# Patient Record
Sex: MALE | Race: WHITE | NOT HISPANIC OR LATINO | Employment: FULL TIME | URBAN - METROPOLITAN AREA
[De-identification: names, ages, dates, MRNs, and addresses within clinical notes are randomized per-mention and may not be internally consistent; named-entity substitution may affect disease eponyms.]

---

## 2019-01-20 ENCOUNTER — HOSPITAL ENCOUNTER (EMERGENCY)
Facility: MEDICAL CENTER | Age: 59
End: 2019-01-20
Attending: EMERGENCY MEDICINE
Payer: COMMERCIAL

## 2019-01-20 ENCOUNTER — APPOINTMENT (OUTPATIENT)
Dept: RADIOLOGY | Facility: MEDICAL CENTER | Age: 59
End: 2019-01-20
Attending: EMERGENCY MEDICINE
Payer: COMMERCIAL

## 2019-01-20 VITALS
HEIGHT: 70 IN | WEIGHT: 227.07 LBS | BODY MASS INDEX: 32.51 KG/M2 | SYSTOLIC BLOOD PRESSURE: 145 MMHG | OXYGEN SATURATION: 95 % | HEART RATE: 60 BPM | DIASTOLIC BLOOD PRESSURE: 87 MMHG | RESPIRATION RATE: 18 BRPM | TEMPERATURE: 96.8 F

## 2019-01-20 DIAGNOSIS — K42.9 UMBILICAL HERNIA WITHOUT OBSTRUCTION AND WITHOUT GANGRENE: ICD-10-CM

## 2019-01-20 LAB
ALBUMIN SERPL BCP-MCNC: 4.5 G/DL (ref 3.2–4.9)
ALBUMIN/GLOB SERPL: 1.5 G/DL
ALP SERPL-CCNC: 75 U/L (ref 30–99)
ALT SERPL-CCNC: 23 U/L (ref 2–50)
ANION GAP SERPL CALC-SCNC: 8 MMOL/L (ref 0–11.9)
AST SERPL-CCNC: 20 U/L (ref 12–45)
BASOPHILS # BLD AUTO: 1 % (ref 0–1.8)
BASOPHILS # BLD: 0.06 K/UL (ref 0–0.12)
BILIRUB SERPL-MCNC: 0.4 MG/DL (ref 0.1–1.5)
BUN SERPL-MCNC: 18 MG/DL (ref 8–22)
CALCIUM SERPL-MCNC: 9.7 MG/DL (ref 8.5–10.5)
CHLORIDE SERPL-SCNC: 109 MMOL/L (ref 96–112)
CO2 SERPL-SCNC: 25 MMOL/L (ref 20–33)
CREAT SERPL-MCNC: 1.16 MG/DL (ref 0.5–1.4)
EOSINOPHIL # BLD AUTO: 0.42 K/UL (ref 0–0.51)
EOSINOPHIL NFR BLD: 7.2 % (ref 0–6.9)
ERYTHROCYTE [DISTWIDTH] IN BLOOD BY AUTOMATED COUNT: 43.4 FL (ref 35.9–50)
GLOBULIN SER CALC-MCNC: 3 G/DL (ref 1.9–3.5)
GLUCOSE SERPL-MCNC: 89 MG/DL (ref 65–99)
HCT VFR BLD AUTO: 44 % (ref 42–52)
HGB BLD-MCNC: 15 G/DL (ref 14–18)
IMM GRANULOCYTES # BLD AUTO: 0.02 K/UL (ref 0–0.11)
IMM GRANULOCYTES NFR BLD AUTO: 0.3 % (ref 0–0.9)
LIPASE SERPL-CCNC: 7 U/L (ref 11–82)
LYMPHOCYTES # BLD AUTO: 2.05 K/UL (ref 1–4.8)
LYMPHOCYTES NFR BLD: 35.2 % (ref 22–41)
MCH RBC QN AUTO: 32.4 PG (ref 27–33)
MCHC RBC AUTO-ENTMCNC: 34.1 G/DL (ref 33.7–35.3)
MCV RBC AUTO: 95 FL (ref 81.4–97.8)
MONOCYTES # BLD AUTO: 0.58 K/UL (ref 0–0.85)
MONOCYTES NFR BLD AUTO: 9.9 % (ref 0–13.4)
NEUTROPHILS # BLD AUTO: 2.7 K/UL (ref 1.82–7.42)
NEUTROPHILS NFR BLD: 46.4 % (ref 44–72)
NRBC # BLD AUTO: 0 K/UL
NRBC BLD-RTO: 0 /100 WBC
PLATELET # BLD AUTO: 215 K/UL (ref 164–446)
PMV BLD AUTO: 9.2 FL (ref 9–12.9)
POTASSIUM SERPL-SCNC: 4.2 MMOL/L (ref 3.6–5.5)
PROT SERPL-MCNC: 7.5 G/DL (ref 6–8.2)
RBC # BLD AUTO: 4.63 M/UL (ref 4.7–6.1)
SODIUM SERPL-SCNC: 142 MMOL/L (ref 135–145)
WBC # BLD AUTO: 5.8 K/UL (ref 4.8–10.8)

## 2019-01-20 PROCEDURE — 83690 ASSAY OF LIPASE: CPT

## 2019-01-20 PROCEDURE — 80053 COMPREHEN METABOLIC PANEL: CPT

## 2019-01-20 PROCEDURE — 74177 CT ABD & PELVIS W/CONTRAST: CPT

## 2019-01-20 PROCEDURE — 99284 EMERGENCY DEPT VISIT MOD MDM: CPT

## 2019-01-20 PROCEDURE — 85025 COMPLETE CBC W/AUTO DIFF WBC: CPT

## 2019-01-20 PROCEDURE — 700117 HCHG RX CONTRAST REV CODE 255: Performed by: EMERGENCY MEDICINE

## 2019-01-20 PROCEDURE — 36415 COLL VENOUS BLD VENIPUNCTURE: CPT

## 2019-01-20 RX ADMIN — IOHEXOL 100 ML: 350 INJECTION, SOLUTION INTRAVENOUS at 06:40

## 2019-01-20 NOTE — ED PROVIDER NOTES
"ED Provider Note    CHIEF COMPLAINT  Chief Complaint   Patient presents with   • Abdominal Pain     Pt reports pain at the umbilical site while moving heaving objects.         HPI  Lg Aldana is a 58 y.o. male who presents with abdominal pain.  Localized at the umbilicus.  Started while moving a heavy object.  Has pain if he pushes over his umbilicus.  This can be quite severe if he sits still and does not move he does not have pain.  The pain is also worsened with sitting up or flexing his abdominal muscles.  Sharp nonradiating started this morning.  No fever.  No vomiting.  Normal bowel movement.    REVIEW OF SYSTEMS  As above  All other systems are negative.     PAST MEDICAL HISTORY  History reviewed. No pertinent past medical history.    FAMILY HISTORY  History reviewed. No pertinent family history.    SOCIAL HISTORY  Social History   Substance Use Topics   • Smoking status: Never Smoker   • Smokeless tobacco: Current User     Types: Chew   • Alcohol use No       SURGICAL HISTORY  History reviewed. No pertinent surgical history.    CURRENT MEDICATIONS  Home Medications     Reviewed by Libertad Schmidt R.N. (Registered Nurse) on 01/20/19 at 0584  Med List Status: Unable to Obtain   Medication Last Dose Status        Patient Johnathan Taking any Medications                       ALLERGIES  Allergies   Allergen Reactions   • Morphine Shortness of Breath       PHYSICAL EXAM  VITAL SIGNS: /93   Pulse 84   Temp 36.2 °C (97.2 °F) (Temporal)   Resp 18   Ht 1.778 m (5' 10\")   Wt 103 kg (227 lb 1.2 oz)   SpO2 98%   BMI 32.58 kg/m²   Constitutional: Awake and alert  HENT: Moist mucous membranes  Eyes: Sclera white  Neck: Normal range of motion  Cardiovascular: Normal heart rate, Normal rhythm  Thorax & Lungs: Normal breath sounds, No respiratory distress, No wheezing, No chest tenderness.   Abdomen: Patient has tenderness in the umbilicus.  Questionable palpable mass.  No tenderness throughout the abdomen " otherwise.  Skin: No rash.   Back: No tenderness, No CVA tenderness.   Extremities: Intact, symmetric distal pulses, no edema.  Neurologic: Grossly normal    RADIOLOGY/PROCEDURES  CT-ABDOMEN-PELVIS WITH   Final Result      Moderate colonic diverticulosis without evidence of diverticulitis      Hepatic and renal cysts      Left hemidiaphragm elevation      L5 pars interarticularis defects         Imaging is interpreted by radiologist    Labs:   Results for orders placed or performed during the hospital encounter of 01/20/19   CBC WITH DIFFERENTIAL   Result Value Ref Range    WBC 5.8 4.8 - 10.8 K/uL    RBC 4.63 (L) 4.70 - 6.10 M/uL    Hemoglobin 15.0 14.0 - 18.0 g/dL    Hematocrit 44.0 42.0 - 52.0 %    MCV 95.0 81.4 - 97.8 fL    MCH 32.4 27.0 - 33.0 pg    MCHC 34.1 33.7 - 35.3 g/dL    RDW 43.4 35.9 - 50.0 fL    Platelet Count 215 164 - 446 K/uL    MPV 9.2 9.0 - 12.9 fL    Neutrophils-Polys 46.40 44.00 - 72.00 %    Lymphocytes 35.20 22.00 - 41.00 %    Monocytes 9.90 0.00 - 13.40 %    Eosinophils 7.20 (H) 0.00 - 6.90 %    Basophils 1.00 0.00 - 1.80 %    Immature Granulocytes 0.30 0.00 - 0.90 %    Nucleated RBC 0.00 /100 WBC    Neutrophils (Absolute) 2.70 1.82 - 7.42 K/uL    Lymphs (Absolute) 2.05 1.00 - 4.80 K/uL    Monos (Absolute) 0.58 0.00 - 0.85 K/uL    Eos (Absolute) 0.42 0.00 - 0.51 K/uL    Baso (Absolute) 0.06 0.00 - 0.12 K/uL    Immature Granulocytes (abs) 0.02 0.00 - 0.11 K/uL    NRBC (Absolute) 0.00 K/uL   COMP METABOLIC PANEL   Result Value Ref Range    Sodium 142 135 - 145 mmol/L    Potassium 4.2 3.6 - 5.5 mmol/L    Chloride 109 96 - 112 mmol/L    Co2 25 20 - 33 mmol/L    Anion Gap 8.0 0.0 - 11.9    Glucose 89 65 - 99 mg/dL    Bun 18 8 - 22 mg/dL    Creatinine 1.16 0.50 - 1.40 mg/dL    Calcium 9.7 8.5 - 10.5 mg/dL    AST(SGOT) 20 12 - 45 U/L    ALT(SGPT) 23 2 - 50 U/L    Alkaline Phosphatase 75 30 - 99 U/L    Total Bilirubin 0.4 0.1 - 1.5 mg/dL    Albumin 4.5 3.2 - 4.9 g/dL    Total Protein 7.5 6.0 - 8.2  g/dL    Globulin 3.0 1.9 - 3.5 g/dL    A-G Ratio 1.5 g/dL   LIPASE   Result Value Ref Range    Lipase 7 (L) 11 - 82 U/L   ESTIMATED GFR   Result Value Ref Range    GFR If African American >60 >60 mL/min/1.73 m 2    GFR If Non African American >60 >60 mL/min/1.73 m 2       Medications   iohexol (OMNIPAQUE) 350 mg/mL (100 mL Intravenous Given 1/20/19 0640)       COURSE & MEDICAL DECISION MAKING  Patient presents with umbilical pain.  He has a palpable mass wound which is difficult to differentiate between hernia versus other subcutaneous abnormality.  This area was tender.  I obtained a CT scan laboratory data.  Laboratory data was reassuring.  CT scan did demonstrate small umbilical hernia with incarcerated fat.    I consulted Dr Ng.  Discussed the case.  He advised discharge with follow-up in his office this week.  This seems reasonable.  I precautioned the patient to return to the ER for fever, redness of the skin on the abdomen, vomiting, uncontrolled pain or concern.    Given that this appears to be an on-the-job injury/event I have referred him to occupational health and advised that he follow-up as soon as possible.  I have advised no lifting until cleared.    Patient referred to primary provider for blood pressure management    FINAL IMPRESSION  1.  Umbilical hernia with fat incarceration        This dictation was created using voice recognition software. The accuracy of the dictation is limited to the abilities of the software.  The nursing notes were reviewed and certain aspects of this information were incorporated into this note.    Electronically signed by: Abrahan Malin, 1/20/2019 6:59 AM

## 2019-01-20 NOTE — LETTER
"  FORM C-4:  EMPLOYEE’S CLAIM FOR COMPENSATION/ REPORT OF INITIAL TREATMENT  EMPLOYEE’S CLAIM - PROVIDE ALL INFORMATION REQUESTED   First Name  Lg Last Name  Jovan Birthdate             Age  1960 58 y.o. Sex  male Claim Number   Home Employee Address  [address]  [facility]                                     Zip  [address] Height  1.778 m (5' 10\") Weight  103 kg (227 lb 1.2 oz) [facility]     Mailing Employee Address                           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   Employer's Name  [employer] Telephone  [phone]    Employer Address  [address] [facility] [address]] Zip  [address]   Date of Injury  1/20/2019       Hour of Injury  3:00 AM Date Employer Notified  1/20/2019 Last Day of Work after Injury or Occupational Disease  1/20/2019 Supervisor to Whom Injury Reported  [employer] [employer]    Address or Location of Accident (if applicable)  [[facility] 3rd floor [address]]   What were you doing at the time of accident? (if applicable)  IC ingressond Picture frame Egress    How did this injury or occupational disease occur? Be specific and answer in detail. Use additional sheet if necessary)  Pulling Picture frAmes from [employer]   If you believe that you have an occupational disease, when did you first have knowledge of the disability and it relationship to your employment?  NA Witnesses to the Accident  NA     Nature of Injury or Occupational Disease  Workers' Compensation  Part(s) of Body Injured or Affected  Internal Organs, N/A, N/A    I certify that the above is true and correct to the best of my knowledge and that I have provided this information in order to obtain the benefits of [place] " [place] or [place]).  I hereby authorize any physician, chiropractor, surgeon, practitioner, or other person, any hospital, including [facility] hospital, any medical service organization, any insurance company, or other institution or organization to release to each other, any medical or other information, including benefits paid or payable, pertinent to this injury or disease, except information relative to diagnosis, treatment and/or counseling for AIDS, psychological conditions, alcohol or controlled substances, for which I must give specific authorization.  A Photostat of this authorization shall be as valid as the original.   Date  01/20/2019 Place  [facility] Employee’s Signature   THIS REPORT MUST BE COMPLETED AND MAILED WITHIN 3 WORKING DAYS OF TREATMENT   Place  [facility], EMERGENCY DEPT  Name of Facility   [facility]   Date  1/20/2019 Diagnosis  (K42.9) Umbilical hernia without obstruction and without gangrene Is there evidence the injured employee was under the influence of alcohol and/or another controlled substance at the time of accident?   Hour  7:34 AM Description of Injury or Disease  Umbilical hernia without obstruction and without gangrene No   Treatment  Referral for surgical repair of hernia  Have you advised the patient to remain off work five days or more?         No   X-Ray Findings  Positive  Comments:Umbilical hernia with fat incarceration   If Yes   From Date    To Date      From information given by the employee, together with medical evidence, can you directly connect this injury or occupational disease as job incurred?  Yes If No, is the employee capable of: Full Duty  No Modified Duty  Yes   Is additional medical care by a physician indicated?  Yes If Modified Duty, Specify any Limitations / Restrictions  No lifting more than 5  "pounds until cleared by occupational health     Do you know of any previous injury or disease contributing to this condition or occupational disease?  No   Date  1/20/2019 Print Doctor’s Name  Jericho Malin certify the employer’s copy of this form was mailed on:   Address  [address] [facility] [address] [phone] Insurer’s Use Only   [facility]  [address]    Provider’s Tax ID Number  [id number] Telephone  Dept: [phone]    Doctor’s Signature  e-JERICHO Chicas M.D. Degree   M.D.    Original - TREATING PHYSICIAN OR CHIROPRACTOR   Pg 2-Insurer/TPA   Pg 3-Employer   Pg 4-Employee                                                                                                  Form C-4 (rev01/03)   BRIEF DESCRIPTION OF RIGHTS AND BENEFITS  (Pursuant to [place]RS 616C.050)  Notice of Injury or Occupational Disease (Incident Report Form C-1): If an injury or occupational disease (OD) arises out of and in the course of employment, you must provide written notice to your employer as soon as practicable, but no later than 7 days after the accident or OD. Your employer shall maintain a sufficient supply of the required forms.  Claim for Compensation (Form C-4): If medical treatment is sought, the form C-4 is available at the place of initial treatment. A completed \"Claim for Compensation\" (Form C-4) must be filed within 90 days after an accident or OD. The treating physician or chiropractor must, within 3 working days after treatment, complete and mail to the employer, the employer's insurer and third-party , the Claim for Compensation.  Medical Treatment: If you require medical treatment for your on-the-job injury or OD, you may be required to select a physician or chiropractor from a list provided by your workers’ compensation insurer, if it has contracted with an Organization for Managed Care (MCO) or Preferred Provider Organization (PPO) or providers of health care. If your " employer has not entered into a contract with an MCO or PPO, you may select a physician or chiropractor from the Panel of Physicians and Chiropractors. Any medical costs related to your industrial injury or OD will be paid by your insurer.  Temporary Total Disability (TTD): If your doctor has certified that you are unable to work for a period of at least 5 consecutive days, or 5 cumulative days in a 20-day period, or places restrictions on you that your employer does not accommodate, you may be entitled to TTD compensation.  Temporary Partial Disability (TPD): If the wage you receive upon reemployment is less than the compensation for TTD to which you are entitled, the insurer may be required to pay you TPD compensation to make up the difference. TPD can only be paid for a maximum of 24 months.  Permanent Partial Disability (PPD): When your medical condition is stable and there is an indication of a PPD as a result of your injury or OD, within 30 days, your insurer must arrange for an evaluation by a rating physician or chiropractor to determine the degree of your PPD. The amount of your PPD award depends on the date of injury, the results of the PPD evaluation and your age and wage.  Permanent Total Disability (PTD): If you are medically certified by a treating physician or chiropractor as permanently and totally disabled and have been granted a PTD status by your insurer, you are entitled to receive monthly benefits not to exceed 66 2/3% of your average monthly wage. The amount of your PTD payments is subject to reduction if you previously received a PPD award.  Vocational Rehabilitation Services: You may be eligible for vocational rehabilitation services if you are unable to return to the job due to a permanent physical impairment or permanent restrictions as a result of your injury or occupational disease.  Transportation and Per [place] Reimbursement: You may be eligible for travel expenses and per colby associated  with medical treatment.  Reopening: You may be able to reopen your claim if your condition worsens after claim closure.  Appeal Process: If you disagree with a written determination issued by the insurer or the insurer does not respond to your request, you may appeal to the Department of Administration, , by following the instructions contained in your determination letter. You must appeal the determination within 70 days from the date of the determination letter at [address], [address], [place], [place] [address], or [address] [place], [place], [place] [address]. If you disagree with the  decision, you may appeal to the Department of Administration, . You must file your appeal within 30 days from the date of the  decision letter at [address], [address], [place], [place] [address], or [address], [place], [place] [address]. If you disagree with a decision of an , you may file a petition for judicial review with the District Court. You must do so within 30 days of the Appeal Officer’s decision. You may be represented by an  at your own expense or you may contact the [facility] for possible representation.  [place]  for Injured Workers (NAIW): If you disagree with a  decision, you may request that NAIW represent you without charge at an  Hearing. For information regarding denial of benefits, you may contact the [facility] at: [address], [address], [place] [address], [phone], or [address] [place], [place] [address], [phone]  To File a Complaint with the Division: If you wish to file a complaint with the  of the Division of Industrial Relations (DIR), please contact the Workers’ Compensation Section, [address], [address], [place], [place] [address], telephone (214) [phone], or [address], [address], [place], [place] [address], telephone [phone]) [phone].  For assistance with Workers’ Compensation Issues: you may contact the Office of the Governor Consumer Health Assistance, [address] [place] [address], [address], [place], [place] [address], Toll Free 1-533.313.2490, Web site: http://[employer].[facility].nv., E-mail [email or web address].                                                                                                                                                                   __________________________________________________________________               01/20/2019            Employee Name / Signature                                                                                                                            Date                                       D-2 (rev. 10/07)

## 2019-01-20 NOTE — ED TRIAGE NOTES
"Chief Complaint   Patient presents with   • Abdominal Pain     Pt reports pain at the umbilical site while moving heaving objects.       /93   Pulse 84   Temp 36.2 °C (97.2 °F) (Temporal)   Resp 18   Ht 1.778 m (5' 10\")   Wt 103 kg (227 lb 1.2 oz)   SpO2 98%   BMI 32.58 kg/m²     Pt comes to ER with new onset abdominal pain. Pain is described as dull unless the umbilical region is palpated. Pt also reports pain with laughter and contraction of abdominal muscles. Pt reports that he was moving heavy objects at work while this happened. Denies N/V/D.    VSS on RA, A&Ox4.     Pt encouraged to report any changes in condition to staff, back to ED lobby with steady gait.  "

## 2019-01-20 NOTE — ED NOTES
This RN assumed report from previous RN. Pt care taken over by this RN at this time.     Pt assessed by this RN. This RN introduced self to patient and updated on POC. Pt verbalized understanding. Pt resting with no distress. VSS at this time. Call bell in reach. Continue to monitor.

## 2019-01-20 NOTE — ED NOTES
Pt discharge ordered by provider. Pt discharge instructions reviewed with patient by this RN. Pt verbalized understanding of discharge information. Pt discharged alert, oriented, and ambulatory by this RN. Pt has concerns regarding occupational followup. With Dr. Malin's permission this RN printed copy of patients CT scan report to bring with him to f/u appt. Pt educated that Dr. Malin has connected him with Surgeon Dr. Ng who has availability to see him this week if his occupational health provider approves. Pt verbalized understanding of dc instructions.

## 2019-02-07 ENCOUNTER — OCCUPATIONAL MEDICINE (OUTPATIENT)
Dept: OCCUPATIONAL MEDICINE | Facility: CLINIC | Age: 59
End: 2019-02-07
Payer: COMMERCIAL

## 2019-02-07 VITALS
SYSTOLIC BLOOD PRESSURE: 144 MMHG | BODY MASS INDEX: 32.29 KG/M2 | WEIGHT: 218 LBS | DIASTOLIC BLOOD PRESSURE: 86 MMHG | HEART RATE: 105 BPM | OXYGEN SATURATION: 96 % | HEIGHT: 69 IN | TEMPERATURE: 98.4 F

## 2019-02-07 DIAGNOSIS — K42.9 UMBILICAL HERNIA WITHOUT OBSTRUCTION AND WITHOUT GANGRENE: ICD-10-CM

## 2019-02-07 PROCEDURE — 99203 OFFICE O/P NEW LOW 30 MIN: CPT | Performed by: PREVENTIVE MEDICINE

## 2019-02-07 NOTE — LETTER
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  Diagnosis: The encounter diagnosis was Umbilical hernia without obstruction and without gangrene.    Medical Information   Related to Industrial Injury?   Comments:Indeterminate    Subjective Complaints:  DOI 1/20/2019: 57 yo male presents with umbilical hernia.  He was moving a heavy object when he noted sudden pain in his abdomen and protrusion of umbilicus.  He was seen in the ER, CT scan confirmed umbilical hernia.  Patient states overall symptoms about the same.  The hernia still somewhat tender.  Denies nausea or vomiting.  Patient states that he works down here 2 weeks and then returns home to [place] for 2 weeks and would prefer to have surgery done there in [place].   Objective Findings: Abdomen: Noted umbilical hernia, mildly tender, no other areas of tenderness.  Bowel sounds x4.   Pre-Existing Condition(s):     Assessment:   Condition Same    Status: Additional Care Required  Permanent Disability:No    Plan:      Diagnostics:      Comments:  Referral to general surgery  Restricted duty  OTC NSAIDs as needed  Follow up 4 weeks if not seen by General surgery.    Disability Information   Status: Released to Restricted Duty    From:  2/7/2019  Through: 3/7/2019 Restrictions are:     Physical Restrictions   Sitting:    Standing:    Stooping:    Bending:      Squatting:    Walking:    Climbing:    Pushing:      Pulling:    Other:    Reaching Above Shoulder (L):   Reaching Above Shoulder (R):       Reaching Below Shoulder  (L):    Reaching Below Shoulder (R):      Not to exceed Weight Limits   Carrying(hrs):   Weight Limit(lb):   Lifting(hrs):   Weight  Limit(lb):     Comments: Less than 5 lbs lift/push/pull    Repetitive Actions   Hands: i.e. Fine Manipulations from Grasping:     Feet: i.e. Operating Foot Controls:     Driving / Operate Machinery:     Physician Name: Ethan Knapp D.O. Physician Signature: danielSignTAETHAN HERNANDEZ D.O. e-Signature: Dr. Clarke Cortés, Medical Director   Clinic Name / Location: [address],   [address]  [place] [address] Clinic Phone Number: Dept: [phone]   Appointment Time: 2:15 Pm Visit Start Time: 1:52 PM   Check-In Time:  1:52 Pm Visit Discharge Time:  2:27 PM   Original-Treating Physician or Chiropractor    Page 2-Insurer/TPA    Page 3-Employer    Page 4-Employee

## 2019-02-07 NOTE — PROGRESS NOTES
"Subjective:      Lg Aldana is a 58 y.o. male who presents with Other (Doi 01/20/19- hernia- same )      DOI 1/20/2019: 57 yo male presents with umbilical hernia.  He was moving a heavy object when he noted sudden pain in his abdomen and protrusion of umbilicus.  He was seen in the ER, CT scan confirmed umbilical hernia.     HPI    ROS  ROS: All systems were reviewed on intake form, form was reviewed and signed. See scanned documents in media. Pertinent positives and negatives included in HPI.    PMH: No pertinent past medical history to this problem  MEDS: Medications were reviewed in Epic  ALLERGIES:   Allergies   Allergen Reactions   • Morphine Shortness of Breath     SOCHX: Works as  at [email or web address]  FH: No pertinent family history to this problem     Objective:     /86   Pulse (!) 105   Temp 36.9 °C (98.4 °F)   Ht 1.753 m (5' 9\")   Wt 98.9 kg (218 lb)   SpO2 96%   BMI 32.19 kg/m²      Physical Exam   Constitutional: He is oriented to person, place, and time. He appears well-developed and well-nourished.   HENT:   Right Ear: External ear normal.   Left Ear: External ear normal.   Eyes: Conjunctivae are normal.   Cardiovascular: Normal rate.    Pulmonary/Chest: Effort normal.   Neurological: He is alert and oriented to person, place, and time.   Skin: Skin is warm and dry.   Psychiatric: He has a normal mood and affect. Judgment normal.       Abdomen: Noted umbilical hernia, mildly tender, no other areas of tenderness.  Bowel sounds x4.       Assessment/Plan:     1. Umbilical hernia without obstruction and without gangrene  - REFERRAL TO GENERAL SURGERY    Referral to general surgery  Restricted duty  OTC NSAIDs as needed  Follow up 4 weeks if not seen by General surgery.  "

## 2019-03-07 ENCOUNTER — TELEPHONE (OUTPATIENT)
Dept: OCCUPATIONAL MEDICINE | Facility: CLINIC | Age: 59
End: 2019-03-07

## 2019-03-07 ENCOUNTER — OCCUPATIONAL MEDICINE (OUTPATIENT)
Dept: OCCUPATIONAL MEDICINE | Facility: CLINIC | Age: 59
End: 2019-03-07
Payer: COMMERCIAL

## 2019-03-07 VITALS
TEMPERATURE: 97.8 F | DIASTOLIC BLOOD PRESSURE: 98 MMHG | WEIGHT: 218 LBS | BODY MASS INDEX: 32.29 KG/M2 | HEIGHT: 69 IN | OXYGEN SATURATION: 95 % | SYSTOLIC BLOOD PRESSURE: 140 MMHG | HEART RATE: 96 BPM

## 2019-03-07 DIAGNOSIS — K42.9 UMBILICAL HERNIA WITHOUT OBSTRUCTION AND WITHOUT GANGRENE: ICD-10-CM

## 2019-03-07 PROCEDURE — 99212 OFFICE O/P EST SF 10 MIN: CPT | Performed by: PREVENTIVE MEDICINE

## 2019-03-07 NOTE — LETTER
[address],   [address] [place] [address]  Phone:  [phone] - Fax:  [phone]   Occupational Health [facility] Progress Report and Disability Certification  Date of Service: 3/7/2019   No Show:  No  Date / Time of Next Visit:  [facility] Surgery   Claim Information   Patient Name: Lg Aldana  Claim Number:     Employer: [employer]  Date of Injury: 1/20/2019     Insurer / TPA: [employer]  ID / SSN:     Occupation: Production Asso.  Diagnosis: The encounter diagnosis was Umbilical hernia without obstruction and without gangrene.    Medical Information   Related to Industrial Injury? Yes    Subjective Complaints:  DOI 1/20/2019: 57 yo male presents with umbilical hernia.  He was moving a heavy object when he noted sudden pain in his abdomen and protrusion of umbilicus.  He was seen in the ER, CT scan confirmed umbilical hernia.  Claim approved.  Patient has appointment with general surgery next week.   Objective Findings: Abdomen: Noted umbilical hernia, mildly tender, no other areas of tenderness.  Bowel sounds x4.   Pre-Existing Condition(s):     Assessment:   Condition Same    Status: Discharged / Care Transfer  Permanent Disability:No    Plan:      Diagnostics:      Comments:  Transfer care to general surgery  Continue restricted duty  Follow-up as needed    Disability Information   Status: Released to Restricted Duty    From:  3/7/2019  Through:   Restrictions are: Temporary   Physical Restrictions   Sitting:    Standing:    Stooping:    Bending:      Squatting:    Walking:    Climbing:    Pushing:      Pulling:    Other:    Reaching Above Shoulder (L):   Reaching Above Shoulder (R):       Reaching Below Shoulder (L):    Reaching Below Shoulder (R):      Not to exceed Weight Limits   Carrying(hrs):   Weight Limit(lb):   Lifting(hrs):   Weight  Limit(lb):     Comments: Less than 5 lbs lift/push/pull. In place until cleared by General Surgery       Repetitive Actions   Hands: i.e. Fine Manipulations from Grasping:     Feet: i.e. Operating Foot Controls:     Driving / Operate Machinery:     Physician Name: Ethan Knapp D.O. Physician Signature: ETHAN Arriaga D.O. e-Signature: Dr. Clarke Cortés, Medical Director   Clinic Name / Location: [address],   [address]  [place] [address] Clinic Phone Number: Dept: [phone]   Appointment Time: 3:15 Pm Visit Start Time: 3:07 PM   Check-In Time:  3:05 Pm Visit Discharge Time:  3:25 PM   Original-Treating Physician or Chiropractor    Page 2-Insurer/TPA    Page 3-Employer    Page 4-Employee

## 2019-03-07 NOTE — PROGRESS NOTES
"Subjective:      Lg Aldana is a 58 y.o. male who presents with Follow-Up (01/20/19- hernia- same )      DOI 1/20/2019: 57 yo male presents with umbilical hernia.  He was moving a heavy object when he noted sudden pain in his abdomen and protrusion of umbilicus.  He was seen in the ER, CT scan confirmed umbilical hernia.  Claim approved.  Patient has appointment with general surgery next week.     HPI    ROS       Objective:     /98   Pulse 96   Temp 36.6 °C (97.8 °F)   Ht 1.753 m (5' 9\")   Wt 98.9 kg (218 lb)   SpO2 95%   BMI 32.19 kg/m²      Physical Exam    Abdomen: Noted umbilical hernia, mildly tender, no other areas of tenderness.  Bowel sounds x4.       Assessment/Plan:     1. Umbilical hernia without obstruction and without gangrene  Transfer care to general surgery   Continue restricted duty   Follow-up as needed      "

## 2019-04-11 ENCOUNTER — APPOINTMENT (OUTPATIENT)
Dept: ADMISSIONS | Facility: MEDICAL CENTER | Age: 59
End: 2019-04-11
Attending: SURGERY
Payer: COMMERCIAL

## 2019-04-11 RX ORDER — BUPROPION HYDROCHLORIDE 75 MG/1
75 TABLET ORAL 2 TIMES DAILY
COMMUNITY

## 2019-04-11 RX ORDER — MELOXICAM 15 MG/1
15 TABLET ORAL EVERY EVENING
COMMUNITY

## 2019-04-11 RX ORDER — TESTOSTERONE 40.5 MG/2.5G
40.5 GEL TOPICAL DAILY
COMMUNITY

## 2019-04-11 RX ORDER — RANITIDINE 150 MG/1
150 TABLET ORAL
COMMUNITY

## 2019-04-24 ENCOUNTER — ANESTHESIA EVENT (OUTPATIENT)
Dept: SURGERY | Facility: MEDICAL CENTER | Age: 59
End: 2019-04-24
Payer: COMMERCIAL

## 2019-04-24 ENCOUNTER — HOSPITAL ENCOUNTER (OUTPATIENT)
Facility: MEDICAL CENTER | Age: 59
End: 2019-04-24
Attending: SURGERY | Admitting: SURGERY
Payer: COMMERCIAL

## 2019-04-24 ENCOUNTER — ANESTHESIA (OUTPATIENT)
Dept: SURGERY | Facility: MEDICAL CENTER | Age: 59
End: 2019-04-24
Payer: COMMERCIAL

## 2019-04-24 VITALS
TEMPERATURE: 98.4 F | WEIGHT: 227.29 LBS | HEART RATE: 68 BPM | RESPIRATION RATE: 18 BRPM | SYSTOLIC BLOOD PRESSURE: 138 MMHG | HEIGHT: 70 IN | BODY MASS INDEX: 32.54 KG/M2 | OXYGEN SATURATION: 98 % | DIASTOLIC BLOOD PRESSURE: 92 MMHG

## 2019-04-24 DIAGNOSIS — G89.18 POST-OP PAIN: ICD-10-CM

## 2019-04-24 PROCEDURE — 700111 HCHG RX REV CODE 636 W/ 250 OVERRIDE (IP): Performed by: ANESTHESIOLOGY

## 2019-04-24 PROCEDURE — 700102 HCHG RX REV CODE 250 W/ 637 OVERRIDE(OP): Performed by: ANESTHESIOLOGY

## 2019-04-24 PROCEDURE — A6402 STERILE GAUZE <= 16 SQ IN: HCPCS | Performed by: SURGERY

## 2019-04-24 PROCEDURE — 160009 HCHG ANES TIME/MIN: Performed by: SURGERY

## 2019-04-24 PROCEDURE — 160027 HCHG SURGERY MINUTES - 1ST 30 MINS LEVEL 2: Performed by: SURGERY

## 2019-04-24 PROCEDURE — 160046 HCHG PACU - 1ST 60 MINS PHASE II: Performed by: SURGERY

## 2019-04-24 PROCEDURE — 501445 HCHG STAPLER, SKIN DISP: Performed by: SURGERY

## 2019-04-24 PROCEDURE — 160048 HCHG OR STATISTICAL LEVEL 1-5: Performed by: SURGERY

## 2019-04-24 PROCEDURE — 160025 RECOVERY II MINUTES (STATS): Performed by: SURGERY

## 2019-04-24 PROCEDURE — 160002 HCHG RECOVERY MINUTES (STAT): Performed by: SURGERY

## 2019-04-24 PROCEDURE — C1781 MESH (IMPLANTABLE): HCPCS | Performed by: SURGERY

## 2019-04-24 PROCEDURE — 160035 HCHG PACU - 1ST 60 MINS PHASE I: Performed by: SURGERY

## 2019-04-24 PROCEDURE — 501838 HCHG SUTURE GENERAL: Performed by: SURGERY

## 2019-04-24 PROCEDURE — 700101 HCHG RX REV CODE 250: Performed by: ANESTHESIOLOGY

## 2019-04-24 PROCEDURE — A9270 NON-COVERED ITEM OR SERVICE: HCPCS | Performed by: ANESTHESIOLOGY

## 2019-04-24 PROCEDURE — 160038 HCHG SURGERY MINUTES - EA ADDL 1 MIN LEVEL 2: Performed by: SURGERY

## 2019-04-24 PROCEDURE — 700101 HCHG RX REV CODE 250: Performed by: SURGERY

## 2019-04-24 PROCEDURE — 700105 HCHG RX REV CODE 258: Performed by: SURGERY

## 2019-04-24 PROCEDURE — 160036 HCHG PACU - EA ADDL 30 MINS PHASE I: Performed by: SURGERY

## 2019-04-24 PROCEDURE — 700105 HCHG RX REV CODE 258: Performed by: ANESTHESIOLOGY

## 2019-04-24 DEVICE — MESH VENTRALEX ST W/STRAP - 4.3CM SMALL (1EA/CA): Type: IMPLANTABLE DEVICE | Status: FUNCTIONAL

## 2019-04-24 RX ORDER — HYDROMORPHONE HYDROCHLORIDE 1 MG/ML
0.4 INJECTION, SOLUTION INTRAMUSCULAR; INTRAVENOUS; SUBCUTANEOUS
Status: DISCONTINUED | OUTPATIENT
Start: 2019-04-24 | End: 2019-04-24 | Stop reason: HOSPADM

## 2019-04-24 RX ORDER — MEPERIDINE HYDROCHLORIDE 25 MG/ML
6.25 INJECTION INTRAMUSCULAR; INTRAVENOUS; SUBCUTANEOUS
Status: DISCONTINUED | OUTPATIENT
Start: 2019-04-24 | End: 2019-04-24 | Stop reason: HOSPADM

## 2019-04-24 RX ORDER — BUPIVACAINE HYDROCHLORIDE AND EPINEPHRINE 5; 5 MG/ML; UG/ML
INJECTION, SOLUTION EPIDURAL; INTRACAUDAL; PERINEURAL
Status: DISCONTINUED | OUTPATIENT
Start: 2019-04-24 | End: 2019-04-24 | Stop reason: HOSPADM

## 2019-04-24 RX ORDER — DEXAMETHASONE SODIUM PHOSPHATE 4 MG/ML
INJECTION, SOLUTION INTRA-ARTICULAR; INTRALESIONAL; INTRAMUSCULAR; INTRAVENOUS; SOFT TISSUE PRN
Status: DISCONTINUED | OUTPATIENT
Start: 2019-04-24 | End: 2019-04-24 | Stop reason: SURG

## 2019-04-24 RX ORDER — HYDROMORPHONE HYDROCHLORIDE 1 MG/ML
0.2 INJECTION, SOLUTION INTRAMUSCULAR; INTRAVENOUS; SUBCUTANEOUS
Status: DISCONTINUED | OUTPATIENT
Start: 2019-04-24 | End: 2019-04-24 | Stop reason: HOSPADM

## 2019-04-24 RX ORDER — CEFAZOLIN SODIUM 1 G/3ML
INJECTION, POWDER, FOR SOLUTION INTRAMUSCULAR; INTRAVENOUS PRN
Status: DISCONTINUED | OUTPATIENT
Start: 2019-04-24 | End: 2019-04-24 | Stop reason: SURG

## 2019-04-24 RX ORDER — OXYCODONE HCL 5 MG/5 ML
10 SOLUTION, ORAL ORAL
Status: COMPLETED | OUTPATIENT
Start: 2019-04-24 | End: 2019-04-24

## 2019-04-24 RX ORDER — KETOROLAC TROMETHAMINE 30 MG/ML
INJECTION, SOLUTION INTRAMUSCULAR; INTRAVENOUS PRN
Status: DISCONTINUED | OUTPATIENT
Start: 2019-04-24 | End: 2019-04-24 | Stop reason: SURG

## 2019-04-24 RX ORDER — ONDANSETRON 2 MG/ML
INJECTION INTRAMUSCULAR; INTRAVENOUS PRN
Status: DISCONTINUED | OUTPATIENT
Start: 2019-04-24 | End: 2019-04-24 | Stop reason: SURG

## 2019-04-24 RX ORDER — HYDROMORPHONE HYDROCHLORIDE 1 MG/ML
0.1 INJECTION, SOLUTION INTRAMUSCULAR; INTRAVENOUS; SUBCUTANEOUS
Status: DISCONTINUED | OUTPATIENT
Start: 2019-04-24 | End: 2019-04-24 | Stop reason: HOSPADM

## 2019-04-24 RX ORDER — ONDANSETRON 2 MG/ML
4 INJECTION INTRAMUSCULAR; INTRAVENOUS
Status: DISCONTINUED | OUTPATIENT
Start: 2019-04-24 | End: 2019-04-24 | Stop reason: HOSPADM

## 2019-04-24 RX ORDER — HYDROCODONE BITARTRATE AND ACETAMINOPHEN 5; 325 MG/1; MG/1
1-2 TABLET ORAL EVERY 4 HOURS PRN
Qty: 10 TAB | Refills: 0 | Status: SHIPPED | OUTPATIENT
Start: 2019-04-24 | End: 2019-04-26

## 2019-04-24 RX ORDER — HALOPERIDOL 5 MG/ML
1 INJECTION INTRAMUSCULAR
Status: DISCONTINUED | OUTPATIENT
Start: 2019-04-24 | End: 2019-04-24 | Stop reason: HOSPADM

## 2019-04-24 RX ORDER — SODIUM CHLORIDE, SODIUM LACTATE, POTASSIUM CHLORIDE, CALCIUM CHLORIDE 600; 310; 30; 20 MG/100ML; MG/100ML; MG/100ML; MG/100ML
INJECTION, SOLUTION INTRAVENOUS CONTINUOUS
Status: DISCONTINUED | OUTPATIENT
Start: 2019-04-24 | End: 2019-04-24 | Stop reason: HOSPADM

## 2019-04-24 RX ORDER — OXYCODONE HCL 5 MG/5 ML
5 SOLUTION, ORAL ORAL
Status: COMPLETED | OUTPATIENT
Start: 2019-04-24 | End: 2019-04-24

## 2019-04-24 RX ORDER — HYDRALAZINE HYDROCHLORIDE 20 MG/ML
5 INJECTION INTRAMUSCULAR; INTRAVENOUS
Status: DISCONTINUED | OUTPATIENT
Start: 2019-04-24 | End: 2019-04-24 | Stop reason: HOSPADM

## 2019-04-24 RX ORDER — LIDOCAINE HYDROCHLORIDE 10 MG/ML
INJECTION, SOLUTION INFILTRATION; PERINEURAL
Status: DISCONTINUED
Start: 2019-04-24 | End: 2019-04-24 | Stop reason: HOSPADM

## 2019-04-24 RX ORDER — DIPHENHYDRAMINE HYDROCHLORIDE 50 MG/ML
12.5 INJECTION INTRAMUSCULAR; INTRAVENOUS
Status: DISCONTINUED | OUTPATIENT
Start: 2019-04-24 | End: 2019-04-24 | Stop reason: HOSPADM

## 2019-04-24 RX ADMIN — ONDANSETRON 4 MG: 2 INJECTION INTRAMUSCULAR; INTRAVENOUS at 12:39

## 2019-04-24 RX ADMIN — SUGAMMADEX 200 MG: 100 INJECTION, SOLUTION INTRAVENOUS at 12:42

## 2019-04-24 RX ADMIN — KETOROLAC TROMETHAMINE 30 MG: 30 INJECTION, SOLUTION INTRAMUSCULAR at 12:40

## 2019-04-24 RX ADMIN — FENTANYL CITRATE 50 MCG: 50 INJECTION, SOLUTION INTRAMUSCULAR; INTRAVENOUS at 12:25

## 2019-04-24 RX ADMIN — FENTANYL CITRATE 50 MCG: 50 INJECTION, SOLUTION INTRAMUSCULAR; INTRAVENOUS at 12:20

## 2019-04-24 RX ADMIN — PROPOFOL 200 MG: 10 INJECTION, EMULSION INTRAVENOUS at 12:21

## 2019-04-24 RX ADMIN — ROCURONIUM BROMIDE 50 MG: 10 INJECTION, SOLUTION INTRAVENOUS at 12:21

## 2019-04-24 RX ADMIN — OXYCODONE HYDROCHLORIDE 10 MG: 5 SOLUTION ORAL at 13:30

## 2019-04-24 RX ADMIN — LIDOCAINE HYDROCHLORIDE 50 MG: 20 INJECTION, SOLUTION INFILTRATION; PERINEURAL at 12:21

## 2019-04-24 RX ADMIN — SODIUM CHLORIDE, POTASSIUM CHLORIDE, SODIUM LACTATE AND CALCIUM CHLORIDE 100 ML: 600; 310; 30; 20 INJECTION, SOLUTION INTRAVENOUS at 13:36

## 2019-04-24 RX ADMIN — DEXAMETHASONE SODIUM PHOSPHATE 4 MG: 4 INJECTION, SOLUTION INTRA-ARTICULAR; INTRALESIONAL; INTRAMUSCULAR; INTRAVENOUS; SOFT TISSUE at 12:24

## 2019-04-24 RX ADMIN — LIDOCAINE HYDROCHLORIDE 0.5 ML: 10 INJECTION, SOLUTION EPIDURAL; INFILTRATION; INTRACAUDAL; PERINEURAL at 10:50

## 2019-04-24 RX ADMIN — MIDAZOLAM HYDROCHLORIDE 2 MG: 1 INJECTION, SOLUTION INTRAMUSCULAR; INTRAVENOUS at 12:16

## 2019-04-24 RX ADMIN — SODIUM CHLORIDE, POTASSIUM CHLORIDE, SODIUM LACTATE AND CALCIUM CHLORIDE: 600; 310; 30; 20 INJECTION, SOLUTION INTRAVENOUS at 11:00

## 2019-04-24 RX ADMIN — CEFAZOLIN 2 G: 330 INJECTION, POWDER, FOR SOLUTION INTRAMUSCULAR; INTRAVENOUS at 12:25

## 2019-04-24 NOTE — OR NURSING
1256: received to PACU via gurney with oral airway. Respirations spontaneous and unlabored. Abdomen soft.  With gauze and tegaderm dressing to abdomen CDI. Ice pack place over the incision site.  1304: patient awaken. Oral airway dc'd without problem or difficulty.  1330: c/o incisional pain. Pain scale 6/10. Medicated. See MAR  1445: instructed patient to cough and deep breath and also to use the Incentive Spirometer. Able to get to 2500 ml.  1625: report called to Sariah CRABTREE.  1627: transported via gurney to PACU 2. Stable.

## 2019-04-24 NOTE — ANESTHESIA PREPROCEDURE EVALUATION
Borderline sleep apnea per pt, not req tx  Relevant Problems   Within Normal Limits   ANESTHESIA   CARDIAC   ENDO   GI   NEURO   PULMONARY       Physical Exam    Airway   Mallampati: II  TM distance: >3 FB  Neck ROM: full       Cardiovascular - normal exam  Rhythm: regular  Rate: normal  (-) murmur     Dental - normal exam         Pulmonary - normal exam  Breath sounds clear to auscultation     Abdominal    Neurological - normal exam                 Anesthesia Plan    ASA 2       Plan - general       Airway plan will be ETT        Induction: intravenous    Postoperative Plan: Postoperative administration of opioids is intended.    Pertinent diagnostic labs and testing reviewed    Informed Consent:    Anesthetic plan and risks discussed with patient.

## 2019-04-24 NOTE — OP REPORT
04/24/19    OPERATIVE NOTE    PRE-OPERATIVE DIAGNOSIS -     1. Umbilical Hernia     POST-OPERATIVE DIAGNOSIS - Same    PROCEDURE PERFORMED -     1.Repair of Umbilical Hernia with Mesh     SURGEON - Harshil Faye M.D.    ASSISTANT - DARIUS Chaney    TYPE OF ANESTHESIA - General     ANESTHESIOLOGIST  - Associated       SPECIMENS - none      PROCEDURE IN DETAIL -     Patient was taken to the operating suite placed in the supine position.  After adequate anesthesia the patient's abdomen was prepped and draped in sterile fashion.  Small curvilinear incision was made just below the umbilicus.  Electrocautery was used to dissect the umbilicus and dissected the hernia sac from the surrounding tissue.  The hernia sac was then resected.  Fascia was then cleared circumferentially around the fascial defect.  A small umbilical hernia mesh was selected and was placed within the hernia defect.  The mesh was then sewn in circumferentially using interrupted #1 Ethibond sutures.  Once the hernia was repaired the umbilicus was then reattached to the fascia using a 3-0 Prolene Vicryl suture.  Additional 3-0 Vicryl sutures were used to reapproximate the subcutaneous tissue.  A 4-0 strata fix suture was then used to reapproximate the skin incision.  Benzoin Steri-Strips and sterile dressings were applied.  Half percent Marcaine with epinephrine was infiltrated into the surrounding tissue for postoperative comfort.      Harshil Faye M.D.

## 2019-04-24 NOTE — ANESTHESIA QCDR
2019 [facility] Clinical Data Registry (for Quality Improvement)     Postoperative nausea/vomiting risk protocol (Adult = 18 yrs and Pediatric 3-17 yrs)- (430 and 463)  General inhalation anesthetic (NOT TIVA) with PONV risk factors: No  Provision of anti-emetic therapy with at least 2 different classes of agents: N/A  Patient DID NOT receive anti-emetic therapy and reason is documented in Medical Record: N/A    Multimodal Pain Management- (AQI59)  Patient undergoing Elective Surgery (i.e. Outpatient, or ASC, or Prescheduled Surgery prior to Hospital Admission): Yes  Use of Multimodal Pain Management, two or more drugs and/or interventions, NOT including systemic opioids: Yes   Exception: Documented allergy to multiple classes of analgesics:  N/A    PACU assessment of acute postoperative pain prior to Anesthesia Care End- Applies to Patients Age = 18- (ABG7)  Initial PACU pain score is which of the following: < 7/10  Patient unable to report pain score: N/A    Post-anesthetic transfer of care checklist/protocol to PACU/ICU- (426 and 427)  Upon conclusion of case, patient transferred to which of the following locations: PACU/Non-ICU  Use of transfer checklist/protocol: Yes  Exclusion: Service Performed in Patient Hospital Room (and thus did not require transfer): N/A    PACU Reintubation- (AQI31)  General anesthesia requiring endotracheal intubation (ETT) along with subsequent extubation in OR or PACU: Yes  Required reintubation in the PACU: No   Extubation was a planned trial documented in the medical record prior to removal of the original airway device:  N/A    Unplanned admission to ICU related to anesthesia service up through end of PACU care- (MD51)  Unplanned admission to ICU (not initially anticipated at anesthesia start time): No

## 2019-04-24 NOTE — H&P
"Date of Service:  4-24-19    PCP: Pcp Pt States None    CC:  Umbilical Hernia     HPI: This is a 58 y.o. male with abdominal pain and an umbilical hernia.     ROS: As above. The remainder of a complete review of systems is negative in all systems except as noted.    PMHx:  Active Ambulatory Problems     Diagnosis Date Noted   • No Active Ambulatory Problems     Resolved Ambulatory Problems     Diagnosis Date Noted   • No Resolved Ambulatory Problems     Past Medical History:   Diagnosis Date   • Anesthesia    • Back pain    • Heart burn    • Sleep apnea        SHx:  Social History     Social History   • Marital status:      Spouse name: N/A   • Number of children: N/A   • Years of education: N/A     Occupational History   • Not on file.     Social History Main Topics   • Smoking status: Never Smoker   • Smokeless tobacco: Current User     Types: Chew      Comment: 4/11/19 - has cut chewing tabacco use in half.     • Alcohol use Yes      Comment: 2/year   • Drug use: No   • Sexual activity: Not on file     Other Topics Concern   • Not on file     Social History Narrative   • No narrative on file       FHx:  family history is not on file.    Allergies:  Allergies   Allergen Reactions   • Morphine Anaphylaxis       Medications:  No current facility-administered medications on file prior to encounter.      No current outpatient prescriptions on file prior to encounter.       Objective Exam:  Vitals:    04/24/19 1019   BP: 138/92   Resp: 18   Temp: 36.7 °C (98.1 °F)   TempSrc: Temporal   SpO2: 96%   Weight: 103.1 kg (227 lb 4.7 oz)   Height: 1.778 m (5' 10\")       Review of Systems  Negative except for abdominal pain       General: Awake and Alert  HEENT: normocephalic, atraumatic  Chest: Clear and equal bilaterally  CV: RRR  Abd: Soft, non-tender, nondistended, small umbilical hernia   Ext: Warm, well perfused   Vasc: in tact   Neuro: Intact  Skin: normal    Laboratory--reviewed personally and are as " follows:  Lab Results   Component Value Date/Time    WBC 5.8 01/20/2019 05:50 AM    RBC 4.63 (L) 01/20/2019 05:50 AM    HEMOGLOBIN 15.0 01/20/2019 05:50 AM    HEMATOCRIT 44.0 01/20/2019 05:50 AM    MCV 95.0 01/20/2019 05:50 AM    MCH 32.4 01/20/2019 05:50 AM    MCHC 34.1 01/20/2019 05:50 AM    MPV 9.2 01/20/2019 05:50 AM    NEUTSPOLYS 46.40 01/20/2019 05:50 AM    LYMPHOCYTES 35.20 01/20/2019 05:50 AM    MONOCYTES 9.90 01/20/2019 05:50 AM    EOSINOPHILS 7.20 (H) 01/20/2019 05:50 AM    BASOPHILS 1.00 01/20/2019 05:50 AM      Lab Results   Component Value Date/Time    SODIUM 142 01/20/2019 05:50 AM    POTASSIUM 4.2 01/20/2019 05:50 AM    CHLORIDE 109 01/20/2019 05:50 AM    CO2 25 01/20/2019 05:50 AM    GLUCOSE 89 01/20/2019 05:50 AM    BUN 18 01/20/2019 05:50 AM    CREATININE 1.16 01/20/2019 05:50 AM      No results found for: PROTHROMBTM, INR     Radiology      MDM:      Umbilical Hernia       Plan- Umbilical hernia repair

## 2019-04-24 NOTE — ANESTHESIA PROCEDURE NOTES
Airway  Date/Time: 4/24/2019 12:21 PM  Performed by: PAPA PALMA  Authorized by: PAPA PALMA     Location:  OR  Urgency:  Elective  Indications for Airway Management:  Anesthesia  Spontaneous Ventilation: absent    Sedation Level:  Deep  Preoxygenated: Yes    Patient Position:  Sniffing  Mask Difficulty Assessment:  0 - not attempted  Final Airway Type:  Endotracheal airway  Final Endotracheal Airway:  ETT  Cuffed: Yes    Technique Used for Successful ETT Placement:  Direct laryngoscopy  Insertion Site:  Oral  Blade Type:  Michael  Laryngoscope Blade/Videolaryngoscope Blade Size:  3  ETT Size (mm):  8.0  Measured from:  Teeth  ETT to Teeth (cm):  22  Placement Verified by: auscultation and capnometry    Cormack-Lehane Classification:  Grade IIa - partial view of glottis  Number of Attempts at Approach:  1

## 2019-04-25 NOTE — OR NURSING
Pt arrived in Phase 2, awake, alert, complains of sore abdominal pain with movement, 2-3 of 10 pain level, denies nausea or SOB, VS stable, personal belonings at bedside, waiting for wife to arrive.

## 2019-04-25 NOTE — DISCHARGE INSTRUCTIONS
ACTIVITY: Rest and take it easy for the first 24 hours.  A responsible adult is recommended to remain with you during that time.  It is normal to feel sleepy.  We encourage you to not do anything that requires balance, judgment or coordination.    MILD FLU-LIKE SYMPTOMS ARE NORMAL. YOU MAY EXPERIENCE GENERALIZED MUSCLE ACHES, THROAT IRRITATION, HEADACHE AND/OR SOME NAUSEA.    FOR 24 HOURS DO NOT:  Drive, operate machinery or run household appliances.  Drink beer or alcoholic beverages.   Make important decisions or sign legal documents.    SPECIAL INSTRUCTIONS: Hernia Repair Discharge Instructions:    1. ACTIVITIES: Upon discharge from the hospital, the day of surgery it is requested that you do no significant physical activity and limit mental activities, as you have had sedation. The day after surgery, you may resume activities of daily living, but for four weeks, it is recommended that you do no strenuous activities or heavy lifting (greater than 15 pounds).     2. DRIVING: You may drive whenever you are off pain medications and are able to perform the activities needed to drive, i.e. turning, bending, twisting, etc.     3. WOUND: It is not unusual for patients to experience swelling and even bruising at the hernia repair site. With inguinal hernias, sometimes the bruising and swelling may extend on to the penis or into the scrotum of male patients. This will resolve over the next few days.     4. ICE: please use ice on the wound to decrease the swelling for the first 24 hours and then discontinue.     5. BATHING: The dressing can be removed two days after surgery and the wound can then be wetted in a shower as normal, but avoid submersion in water (tub bath) for at least a week. Leave steri strips on until they fall off.  It may be necessary to trim the edges.    6. PAIN MEDICATION: You will be given a prescription for pain medication at discharge. Please take these as directed. It is important to remember not  to take medications on an empty stomach as this may cause nausea.     7. BOWEL FUNCTION: After hernia repair, it is not uncommon for patients to experience constipation. This is due to decreasing activity levels as well as pain medications. You may wish to use a stool softener beginning immediately after surgery, and you may or may not need to use a laxative (Milk of Magnesia, Ex-lax; Senokot, etc.) as well.            8 .CALL IF YOU HAVE: (1) Fevers to more than 1010 F, (2) Unusual chest or leg pain, (3) Drainage or fluid from incision that may be foul smelling, increased tenderness or soreness at the wound or the wound edges are no longer together,redness or swelling at the incision site. Please do not hesitate to call with any other questions.     9. APPOINTMENT: Contact our office at [phone] for a follow-up appointment in 1 to 2 weeks following your procedure.     If you have any additional questions, please do not hesitate to call the office and speak to either myself or the physician on call.     Office address:  [address] [place] [address], [address]    Harshil Faye M.D.  [facility]  [phone] Repair Discharge Instructions:      DIET: To avoid nausea, slowly advance diet as tolerated, avoiding spicy or greasy foods for the first day.  Add more substantial food to your diet according to your physician's instructions.  INCREASE FLUIDS AND FIBER TO AVOID CONSTIPATION.      You should CALL YOUR PHYSICIAN if you develop:  Fever greater than 101 degrees F.  Pain not relieved by medication, or persistent nausea or vomiting.  Excessive bleeding (blood soaking through dressing) or unexpected drainage from the wound.  Extreme redness or swelling around the incision site, drainage of pus or foul smelling drainage.  Inability to urinate or empty your bladder within 8 hours.  Problems with breathing or chest pain.    You should call 911 if you develop problems with breathing or chest pain.  If you are  unable to contact your doctor or surgical center, you should go to the nearest emergency room or urgent care center.  Physician's telephone #: [phone]    If any questions arise, call your doctor.  If your doctor is not available, please feel free to call the Surgical Center at [phone].  The Center is open Monday through Friday from 7AM to 7PM.  You can also call the HEALTH HOTLINE open 24 hours/day, 7 days/week and speak to a nurse at [phone], or toll free at [phone].    A registered nurse may call you a few days after your surgery to see how you are doing after your procedure.    MEDICATIONS: Resume taking daily medication.  Take prescribed pain medication with food.  If no medication is prescribed, you may take non-aspirin pain medication if needed.  PAIN MEDICATION CAN BE VERY CONSTIPATING.  Take a stool softener or laxative such as senokot, pericolace, or milk of magnesia if needed.    Prescription given for Norco.  Last pain medication given at 1:30pm.    If your physician has prescribed pain medication that includes Acetaminophen (Tylenol), do not take additional Acetaminophen (Tylenol) while taking the prescribed medication.    Depression / Suicide Risk    As you are discharged from this [facility] facility, it is important to learn how to keep safe from harming yourself.    Recognize the warning signs:  · Abrupt changes in personality, positive or negative- including increase in energy   · Giving away possessions  · Change in eating patterns- significant weight changes-  positive or negative  · Change in sleeping patterns- unable to sleep or sleeping all the time   · Unwillingness or inability to communicate  · Depression  · Unusual sadness, discouragement and loneliness  · Talk of wanting to die  · Neglect of personal appearance   · Rebelliousness- reckless behavior  · Withdrawal from people/activities they love  · Confusion- inability to concentrate     If you or a loved one  observes any of these behaviors or has concerns about self-harm, here's what you can do:  · Talk about it- your feelings and reasons for harming yourself  · Remove any means that you might use to hurt yourself (examples: pills, rope, extension cords, firearm)  · Get professional help from the community (Mental Health, Substance Abuse, psychological counseling)  · Do not be alone:Call your Safe Contact- someone whom you trust who will be there for you.  · Call your local CRISIS HOTLINE 443-2309 or 487-456-6382  · Call your local Children's Mobile Crisis Response Team [place] (399) 753-7695 or www.HESKA  · Call the toll free National Suicide Prevention Hotlines   · National Suicide Prevention Lifeline 601-829-RLEC (8995)  · National Hope Line Network 800-SUICIDE (119-4272)

## 2019-04-25 NOTE — OR NURSING
D/C instructions and RX reviewed with pt and wife, they verbalized understanding of instructions, pt states he feels ready to go home, discharged via wheelchair to home with wife.

## 2019-04-26 ASSESSMENT — PAIN SCALES - GENERAL: PAIN_LEVEL: 1

## 2019-04-26 NOTE — ANESTHESIA POSTPROCEDURE EVALUATION
Patient: Lg Aldana    Procedure Summary     Date:  04/24/19 Room / Location:  [place] [address] / SURGERY [facility]    Anesthesia Start:  1216 Anesthesia Stop:  1258    Procedure:  REPAIR, HERNIA, UMBILICAL - USING MESH (N/A Abdomen) Diagnosis:  (UMBILICAL HERNIA)    Surgeon:  Harshil Faye M.D. Responsible Provider:  Jaskaran Beckham M.D.    Anesthesia Type:  general ASA Status:  2          Final Anesthesia Type: general  Last vitals  BP   Blood Pressure: 138/92, NIBP: 118/76    Temp   36.9 °C (98.4 °F)    Pulse   Pulse: 68, Heart Rate (Monitored): (!) 52   Resp   18    SpO2   98 %      Anesthesia Post Evaluation    Patient location during evaluation: PACU  Patient participation: complete - patient participated  Level of consciousness: awake and alert  Pain score: 1    Airway patency: patent  Anesthetic complications: no  Cardiovascular status: hemodynamically stable  Respiratory status: acceptable  Hydration status: euvolemic    PONV: none           Nurse Pain Score: 1 (NPRS)

## (undated) DEVICE — SENSOR SPO2 NEO LNCS ADHESIVE (20/BX) SEE USER NOTES

## (undated) DEVICE — SUTURE 0 ETHIBOND MO6 C/R - (12/BX) 8-18 INCH ETHICON

## (undated) DEVICE — MASK, LARYNGEAL AIRWAY #4

## (undated) DEVICE — SLEEVE, VASO, THIGH, MED

## (undated) DEVICE — SPONGE GAUZESTER 4 X 4 4PLY - (128PK/CA)

## (undated) DEVICE — CLOSURE SKIN STRIP 1/2 X 4 IN - (STERI STRIP) (50/BX 4BX/CA)

## (undated) DEVICE — CLIP MED INTNL HRZN TI ESCP - (25/BX)

## (undated) DEVICE — SPONGE GAUZESTER. 2X2 4-PL - (2/PK 50PK/BX 30BX/CS)

## (undated) DEVICE — TUBING CLEARLINK DUO-VENT - C-FLO (48EA/CA)

## (undated) DEVICE — SET LEADWIRE 5 LEAD BEDSIDE DISPOSABLE ECG (1SET OF 5/EA)

## (undated) DEVICE — SUTURE 4-0 30CM STRATAFIX SPIRAL PS-2 (12EA/BX)

## (undated) DEVICE — STAPLER SKIN DISP - (6/BX 10BX/CA) VISISTAT

## (undated) DEVICE — CANISTER SUCTION 3000ML MECHANICAL FILTER AUTO SHUTOFF MEDI-VAC NONSTERILE LF DISP  (40EA/CA)

## (undated) DEVICE — GLOVE BIOGEL SZ 8 SURGICAL PF LTX - (50PR/BX 4BX/CA)

## (undated) DEVICE — GOWN WARMING STANDARD FLEX - (30/CA)

## (undated) DEVICE — DRAPE LAPAROTOMY T SHEET - (12EA/CA)

## (undated) DEVICE — STERI STRIP COMPOUND BENZOIN - TINCTURE 0.6ML WITH APPLICATOR (40EA/BX)

## (undated) DEVICE — GLOVE BIOGEL SZ 6 PF LATEX - (50EA/BX 4BX/CA)

## (undated) DEVICE — GLOVE BIOGEL PI ORTHO SZ 6 SURGICAL PF LF (40PR/BX)

## (undated) DEVICE — LACTATED RINGERS INJ 1000 ML - (14EA/CA 60CA/PF)

## (undated) DEVICE — NEEDLE NON SAFETY HYPO 22 GA X 1 1/2 IN (100/BX)

## (undated) DEVICE — HEAD HOLDER JUNIOR/ADULT

## (undated) DEVICE — SUTURE 3-0 VICRYL PLUS - 12 X 18 INCH (12/BX)

## (undated) DEVICE — BLADE SURGICAL #15 - (50/BX 3BX/CA)

## (undated) DEVICE — SYRINGE 10 ML CONTROL LL (25EA/BX 4BX/CA)

## (undated) DEVICE — SUCTION INSTRUMENT YANKAUER BULBOUS TIP W/O VENT (50EA/CA)

## (undated) DEVICE — NEPTUNE 4 PORT MANIFOLD - (20/PK)

## (undated) DEVICE — DRAPE IOBAN II INCISE 23X17 - (10EA/BX 4BX/CA)

## (undated) DEVICE — SUTURE 3-0 VICRYL PLUS SH - 8X 18 INCH (12/BX)

## (undated) DEVICE — PACK MINOR BASIN - (2EA/CA)

## (undated) DEVICE — GOWN SURGEONS LARGE - (32/CA)

## (undated) DEVICE — SUTURE GENERAL

## (undated) DEVICE — SET EXTENSION WITH 2 PORTS (48EA/CA) ***PART #2C8610 IS A SUBSTITUTE*****

## (undated) DEVICE — ELECTRODE 850 FOAM ADHESIVE - HYDROGEL RADIOTRNSPRNT (50/PK)

## (undated) DEVICE — KIT ANESTHESIA W/CIRCUIT & 3/LT BAG W/FILTER (20EA/CA)

## (undated) DEVICE — SODIUM CHL IRRIGATION 0.9% 1000ML (12EA/CA)

## (undated) DEVICE — TUBE E-T HI-LO CUFF 8.0MM (10EA/PK)

## (undated) DEVICE — KIT ROOM DECONTAMINATION

## (undated) DEVICE — CHLORAPREP 26 ML APPLICATOR - ORANGE TINT(25/CA)

## (undated) DEVICE — GLOVE BIOGEL PI INDICATOR SZ 6.5 SURGICAL PF LF - (50/BX 4BX/CA)

## (undated) DEVICE — PROTECTOR ULNA NERVE - (36PR/CA)

## (undated) DEVICE — ELECTRODE DUAL RETURN W/ CORD - (50/PK)

## (undated) DEVICE — DRESSING TRANSPARENT FILM TEGADERM 4 X 4.75" (50EA/BX)"